# Patient Record
Sex: FEMALE | Race: WHITE
[De-identification: names, ages, dates, MRNs, and addresses within clinical notes are randomized per-mention and may not be internally consistent; named-entity substitution may affect disease eponyms.]

---

## 2017-06-17 ENCOUNTER — HEALTH MAINTENANCE LETTER (OUTPATIENT)
Age: 47
End: 2017-06-17

## 2019-04-28 ENCOUNTER — VIRTUAL VISIT (OUTPATIENT)
Dept: FAMILY MEDICINE | Facility: OTHER | Age: 49
End: 2019-04-28

## 2019-04-28 NOTE — PROGRESS NOTES
"Date:   Clinician: Alaina Turner  Clinician NPI: [id number]  Patient: Wilma Gonzales  Patient : 1970  Patient Address: [address] [place] [address]  Patient Phone: [phone]  Visit Protocol: Ingrown toenail  Patient Summary:  Wilma is a 48 year old ( : 1970 ) female who initiated a Visit for evaluation of Ingrown Toenail. When asked the question \"Please sign me up to receive news, health information and promotions. \", Wilma responded \"No\".    Wilma uploaded images of her condition.   Her big toe on her right foot is affected. Her symptoms started 8 to 14 days ago. Wilma has redness and pain on the affected toe. She also notes growth of new skin tissue on or near the affected toe. Wilma is able to walk without limping. She is able to put her shoes on. She has had similar symptoms on the same toe before.    Symptom Details     Redness: The redness has not spread since it started. There are not red lines or streaks going up her leg from the affected toe.     Pain: Wilma describes the pain as severe (7-9 on a 10 point pain scale). She experiences pain only when pressure is applied to the affected toe.     Wilma denies swelling and warmth on the affected toe. She also denies open sores, a deformed or crooked nail, and discharge on or near the affected toe. She also claims that the symptoms did not start as a result of an injury. She does not have a circulation problem and has not had nerve damage in her foot. Wilma does not feel feverish.   Wilma tried soaking the affected foot to treat her condition. The foot soak was ineffective.   She denies pregnancy and denies breastfeeding. She does not menstruate.   Weight: 135 lbs   Wilma does not smoke or use smokeless tobacco.   MEDICATIONS: propranolol oral, escitalopram oxalate oral, ALLERGIES: NKDA  Clinician Response:  Dear Wilma,   Based on the information you provided, you likely have an Ingrown " Toenail.  I am prescribing:  Mupirocin 2% topical ointment. Apply a small amount of ointment to the affected area 3 times per day for 10 days. You should see improvement in 3 to 5 days. There are no refills with this prescription.  Some women may also develop a yeast infection as a side effect of taking antibiotics. If you notice symptoms of a yeast infection, please use [facility] to get treatment.  Some people develop allergies to antibiotics. If you notice a new rash, significant swelling or difficulty breathing, stop the medication immediately and go into a clinic for physical evaluation.  Unless you are allergic to the over-the-counter medication(s) below, I recommend using:  Ibuprofen (Advil or store brand) 200 mg oral tablet. Take 1-3 200mg tablets (200-600mg) by mouth every 8 hours to help with discomfort. Make sure to take the ibuprofen with food. Do not exceed 2400mg in 24 hours. This is an over-the-counter medication that does not require a prescription.  Please take the following precautions to help reduce your symptoms:     Do not wear shoes that are too tight around your toes.    Inappropriate toenail trimming is associated with the formation of ingrown toenails. Cut your toenails either straight across or with a gentle curve, taking care not to cut too deeply down into the corners of the nail. Use a nail file to prevent jagged edges and create a smooth, rounded edge.     You may require more care than what can be provided online. For example, partial removal of the affected nail could be needed for your symptoms to improve. For this reason, I recommend you make an appointment to see a podiatrist or primary care provider to get the most appropriate care for you.  Consult with your primary care provider right away if you experience any of the following symptoms: spreading redness, red lines or streaks going up your leg, soreness, yellow/green/bloody discharge, or a fever.   Diagnosis: Ingrown  toenail  Diagnosis ICD: L60.0  Prescription: mupirocin 2 % topical ointment 1 22 gram tube, 10 days supply. Apply a small amount of ointment to the affected area 3 times per day for 10 days. Refills: 0, Refill as needed: no, Allow substitutions: yes  Pharmacy: [id number] - [phone] - [address] [place] [address]

## 2020-06-08 ENCOUNTER — VIRTUAL VISIT (OUTPATIENT)
Dept: FAMILY MEDICINE | Facility: OTHER | Age: 50
End: 2020-06-08
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
